# Patient Record
Sex: FEMALE | Race: WHITE
[De-identification: names, ages, dates, MRNs, and addresses within clinical notes are randomized per-mention and may not be internally consistent; named-entity substitution may affect disease eponyms.]

---

## 2018-01-06 ENCOUNTER — HOSPITAL ENCOUNTER (EMERGENCY)
Dept: HOSPITAL 31 - C.ER | Age: 33
Discharge: HOME | End: 2018-01-06
Payer: COMMERCIAL

## 2018-01-06 VITALS — RESPIRATION RATE: 17 BRPM

## 2018-01-06 VITALS
HEART RATE: 100 BPM | SYSTOLIC BLOOD PRESSURE: 126 MMHG | TEMPERATURE: 98.1 F | DIASTOLIC BLOOD PRESSURE: 74 MMHG | OXYGEN SATURATION: 100 %
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
## 2018-01-06 DIAGNOSIS — S52.121A: Primary | ICD-10-CM

## 2018-01-06 NOTE — C.PDOC
History Of Present Illness





SP FALL ON 1/1 CO PERSIST R ELBOW PAIN AND SWELLING. PERSIST PAIN WORSE W 

SUPINATION AND FLEXION. DENIES WEAK NUMB. 





EXAM


NONTOXIC


EXT R ELBOW +SWELLING LIMITED ROM REPRODUC PAIN W SUPINATION. 


SKIN INTACT NO ERYTHEMA


NEURO INTACT


REMAINDER NEG


Time Seen by Provider: 01/06/18 17:45


Chief Complaint (Nursing): Upper Extremity Problem/Injury


History Per: Patient


History/Exam Limitations: no limitations


Onset/Duration Of Symptoms: Days


Current Symptoms Are (Timing): Still Present


Severity: Moderate





Past Medical History


Reviewed: Historical Data, Nursing Documentation, Vital Signs


Vital Signs: 


 Last Vital Signs











Temp  98.1 F   01/06/18 17:49


 


Pulse  100 H  01/06/18 17:49


 


Resp  17   01/06/18 18:50


 


BP  126/74   01/06/18 17:49


 


Pulse Ox  100   01/06/18 18:32














- Medical History


PMH: No Chronic Diseases


Surgical History: No Surg Hx


Family History: States: No Known Family Hx





- Social History


Hx Alcohol Use: No


Hx Substance Use: No





- Immunization History


Hx Tetanus Toxoid Vaccination: No


Hx Influenza Vaccination: No


Hx Pneumococcal Vaccination: No





Review Of Systems


Except As Marked, All Systems Reviewed And Found Negative.


Musculoskeletal: Positive for: Arm Pain (right elbow pain)


Neurological: Negative for: Weakness, Numbness





Physical Exam





- Physical Exam


Appears: Non-toxic


Skin: Normal Color, Warm, Other (intact, no erythema)


Head: Atraumatic, Normacephalic


Eye(s): bilateral: Normal Inspection, PERRL


Extremity: No Normal ROM (limited rom, reproducible pain with supination), 

Swelling (swelling in right elbow)


Neurological/Psych: Oriented x3, Normal Speech, Normal Cognition, Normal Motor, 

Normal Sensation, Other (intact)





ED Course And Treatment


O2 Sat by Pulse Oximetry: 100 (RA)


Pulse Ox Interpretation: Normal





- Other Rad


  ** R ELBOW


X-Ray: Interpreted by Me (+RADIAL HEAD FX)





Medical Decision Making


Medical Decision Making: 


Plan:


--X-Ray - Right Elbow





Disposition


Counseled Patient/Family Regarding: Studies Performed, Diagnosis, Need For 

Followup





- Disposition


Referrals: 


Wayne Munson MD [Staff Provider] - 


Disposition: HOME/ ROUTINE


Disposition Time: 18:31


Condition: IMPROVED


Additional Instructions: 


you have a radial head fracture. Wear splint continuously. Schedule follow up 

appointment with orthopedics this week. 


Prescriptions: 


Acetaminophen/Codeine [Tylenol/Codeine 300 MG/30 MG] 2 tab PO Q6H #20 tab


Ibuprofen [Motrin] 600 mg PO Q6 #30 tab


Instructions:  Elbow Fracture in Adults (ED)


Forms:  [facility] (English)





- Clinical Impression


Clinical Impression: 


 Elbow fracture








- Scribe Statement


The provider has reviewed the documentation as recorded by the Jairoiblydia Farmer


Provider Attestation: 





All medical record entries made by the Scribe were at my direction and 

personally dictated by me. I have reviewed the chart and agree that the record 

accurately reflects my personal performance of the history, physical exam, 

medical decision making, and the department course for this patient. I have 

also personally directed, reviewed, and agree with the discharge instructions 

and disposition.





Orthopedic Care


Application Of:: Long-arm Splint

## 2018-01-07 NOTE — RAD
PROCEDURE:  Radiographs of the right elbow.



HISTORY:

TRAUMA  



COMPARISON:

No prior.



FINDINGS:



BONES:

There is acute displaced chip fracture with bony fragment seen in the 

lateral view.  The fracture is likely at the proximal portion of the 

right ulnar bone.



JOINTS:

No evidence of dislocation.



SOFT TISSUES:

Normal.



JOINT EFFUSION:

There is a small joint effusion noted.



OTHER FINDINGS:

None.



IMPRESSION:

Suspicious for fracture at the proximal right ulnar bone with 

displaced bony fragment seen anterior to the right elbow joint.  If 

clinically warranted further assessment by CT is suggested.



Small joint effusion.